# Patient Record
Sex: MALE | Race: BLACK OR AFRICAN AMERICAN | NOT HISPANIC OR LATINO | URBAN - METROPOLITAN AREA
[De-identification: names, ages, dates, MRNs, and addresses within clinical notes are randomized per-mention and may not be internally consistent; named-entity substitution may affect disease eponyms.]

---

## 2021-03-15 ENCOUNTER — VIRTUAL VISIT (OUTPATIENT)
Dept: UROLOGY | Facility: CLINIC | Age: 66
End: 2021-03-15
Payer: COMMERCIAL

## 2021-03-15 DIAGNOSIS — N52.9 ERECTILE DYSFUNCTION, UNSPECIFIED ERECTILE DYSFUNCTION TYPE: Primary | ICD-10-CM

## 2021-03-15 PROCEDURE — 99203 OFFICE O/P NEW LOW 30 MIN: CPT | Mod: GT | Performed by: UROLOGY

## 2021-03-15 RX ORDER — TADALAFIL 20 MG/1
TABLET ORAL
Qty: 30 TABLET | Refills: 3 | Status: SHIPPED | OUTPATIENT
Start: 2021-03-15

## 2021-03-15 NOTE — PROGRESS NOTES
Dominguez is a 65 year old who is being evaluated via a billable video visit.      How would you like to obtain your AVS? MyChart  If the video visit is dropped, the invitation should be resent by: Text to cell phone:    Will anyone else be joining your video visit? No      Video Start Time: 825    Assessment & Plan   Problem List Items Addressed This Visit     None      Visit Diagnoses     Erectile dysfunction, unspecified erectile dysfunction type    -  Primary           Review of external notes as documented elsewhere in note  25 minutes spent on the date of the encounter doing chart review, history and exam, documentation and further activities as noted above            No follow-ups on file.    Obi Anton MD  [facility]   Dominguez is a 65 year old who presents for the following health issues ED    HPI       Patient is a pleasant 65-year-old male who was seen today for a consultation with regard to patient's erectile dysfunction.  Patient has had problems with erectile dysfunction for a couple of years.  He has very soft erections not firm enough for penetration.  He has tried Viagra previously without any improvements.  His risk factor is high blood pressure.  He has no history of smoking.  He is a prediabetes.  His sex drive is good.    Review of Systems   Constitutional, HEENT, cardiovascular, pulmonary, gi and gu systems are negative, except as otherwise noted.      Objective           Vitals:  No vitals were obtained today due to virtual visit.    Physical Exam   GENERAL: Healthy, alert and no distress  EYES: Eyes grossly normal to inspection.  No discharge or erythema, or obvious scleral/conjunctival abnormalities.  RESP: No audible wheeze, cough, or visible cyanosis.  No visible retractions or increased work of breathing.    SKIN: Visible skin clear. No significant rash, abnormal pigmentation or lesions.  NEURO: Cranial nerves grossly intact.  Mentation and speech  appropriate for age.  PSYCH: Mentation appears normal, affect normal/bright, judgement and insight intact, normal speech and appearance well-groomed.    Pleasant 65-year-old male with erectile dysfunction due to long history of hypertension.  Treatment options discussed with patient at length today.  We discussed medical management, vacuum pump, penile injection, and lastly penile prosthesis.  Pros and cons discussed.  Patient elects to try another oral medication.  Cialis prescribed.  Follow-up with me if not better.            Video-Visit Details    Type of service:  Video Visit    Video End Time:845    Originating Location (pt. Location): Home    Distant Location (provider location):  [facility]     Platform used for Video Visit: [email or web address]

## 2021-11-09 ENCOUNTER — OFFICE VISIT (OUTPATIENT)
Dept: ENDOCRINOLOGY | Facility: CLINIC | Age: 66
End: 2021-11-09
Payer: COMMERCIAL

## 2021-11-09 ENCOUNTER — TELEPHONE (OUTPATIENT)
Dept: ENDOCRINOLOGY | Facility: CLINIC | Age: 66
End: 2021-11-09

## 2021-11-09 ENCOUNTER — TELEPHONE (OUTPATIENT)
Dept: NEPHROLOGY | Facility: CLINIC | Age: 66
End: 2021-11-09

## 2021-11-09 VITALS
HEART RATE: 76 BPM | SYSTOLIC BLOOD PRESSURE: 135 MMHG | OXYGEN SATURATION: 97 % | WEIGHT: 200 LBS | DIASTOLIC BLOOD PRESSURE: 76 MMHG | BODY MASS INDEX: 27.12 KG/M2

## 2021-11-09 DIAGNOSIS — I10 HYPERTENSION, UNSPECIFIED TYPE: ICD-10-CM

## 2021-11-09 DIAGNOSIS — Z09 HOSPITAL DISCHARGE FOLLOW-UP: ICD-10-CM

## 2021-11-09 DIAGNOSIS — Z51.81 ENCOUNTER FOR THERAPEUTIC DRUG MONITORING: ICD-10-CM

## 2021-11-09 DIAGNOSIS — E78.5 HYPERLIPIDEMIA LDL GOAL <70: ICD-10-CM

## 2021-11-09 DIAGNOSIS — N18.32 STAGE 3B CHRONIC KIDNEY DISEASE (H): ICD-10-CM

## 2021-11-09 DIAGNOSIS — E11.69 TYPE 2 DIABETES MELLITUS WITH OTHER SPECIFIED COMPLICATION, WITHOUT LONG-TERM CURRENT USE OF INSULIN (H): Primary | ICD-10-CM

## 2021-11-09 PROBLEM — E11.9 DIABETES MELLITUS, TYPE 2 (H): Status: ACTIVE | Noted: 2021-11-09

## 2021-11-09 LAB
ANION GAP SERPL CALCULATED.3IONS-SCNC: 3 MMOL/L (ref 3–14)
BUN SERPL-MCNC: 17 MG/DL (ref 7–30)
CALCIUM SERPL-MCNC: 9.6 MG/DL (ref 8.5–10.1)
CHLORIDE BLD-SCNC: 106 MMOL/L (ref 94–109)
CHOLEST SERPL-MCNC: 150 MG/DL
CO2 SERPL-SCNC: 30 MMOL/L (ref 20–32)
CREAT SERPL-MCNC: 1.29 MG/DL (ref 0.66–1.25)
FASTING STATUS PATIENT QL REPORTED: YES
GFR SERPL CREATININE-BSD FRML MDRD: 58 ML/MIN/1.73M2
GLUCOSE BLD-MCNC: 175 MG/DL (ref 70–99)
HBA1C MFR BLD: 6.6 % (ref 0–5.7)
HDLC SERPL-MCNC: 60 MG/DL
LDLC SERPL CALC-MCNC: 75 MG/DL
NONHDLC SERPL-MCNC: 90 MG/DL
POTASSIUM BLD-SCNC: 4 MMOL/L (ref 3.4–5.3)
SODIUM SERPL-SCNC: 139 MMOL/L (ref 133–144)
TRIGL SERPL-MCNC: 76 MG/DL
TSH SERPL DL<=0.005 MIU/L-ACNC: 0.44 MU/L (ref 0.4–4)

## 2021-11-09 PROCEDURE — 36415 COLL VENOUS BLD VENIPUNCTURE: CPT | Performed by: INTERNAL MEDICINE

## 2021-11-09 PROCEDURE — 99205 OFFICE O/P NEW HI 60 MIN: CPT | Performed by: INTERNAL MEDICINE

## 2021-11-09 PROCEDURE — 80061 LIPID PANEL: CPT | Performed by: INTERNAL MEDICINE

## 2021-11-09 PROCEDURE — 80048 BASIC METABOLIC PNL TOTAL CA: CPT | Performed by: INTERNAL MEDICINE

## 2021-11-09 PROCEDURE — 84443 ASSAY THYROID STIM HORMONE: CPT | Performed by: INTERNAL MEDICINE

## 2021-11-09 PROCEDURE — 83036 HEMOGLOBIN GLYCOSYLATED A1C: CPT | Performed by: INTERNAL MEDICINE

## 2021-11-09 RX ORDER — MULTIPLE VITAMINS W/ MINERALS TAB 9MG-400MCG
1 TAB ORAL DAILY
COMMUNITY

## 2021-11-09 RX ORDER — LISINOPRIL 10 MG/1
10 TABLET ORAL DAILY
Qty: 90 TABLET | Refills: 0 | Status: SHIPPED | OUTPATIENT
Start: 2021-11-09 | End: 2021-11-16

## 2021-11-09 RX ORDER — PREDNISONE 20 MG/1
20 TABLET ORAL PRN
COMMUNITY
Start: 2021-10-26

## 2021-11-09 RX ORDER — PREGABALIN 100 MG/1
100 CAPSULE ORAL
COMMUNITY
Start: 2021-04-09

## 2021-11-09 NOTE — LETTER
11/9/2021         RE: Dominguez Juares  [address]  [place] [address]        Dear Colleague,    Thank you for referring your patient, Dominguez Juares, to the [facility]. Please see a copy of my visit note below.    Endocrinology Clininc Visit    Chief Complaint: New Patient (Self referred, last seen with [facility]) and Diabetes (Diabetes type 2, discuss medication treatment-has problems with Metformin both extended and quick release in the past)     Information obtained from:Patient      Assessment/Treatment Plan:      Hospital discharge follow-up: Was admitted to [facility] for acute cholecystitis and at the time of his admission he has had two blood pressure medications lisinopril with hydrochlorothiazide combination was stopped. He was asked to follow-up as an outpatient for assessment of blood pressure, CKD and medication management of high blood pressure. From cholecystitis standpoint things are stable.    Hypertension: Longstanding hypertension and he was on minoxidil 10 mg twice daily, metoprolol 100 mg daily, lisinopril with hydrochlorothiazide 20-25 mg tablet once a day and amlodipine 10 mg daily at the time of his admission to the hospital. He was noted to have acute on chronic kidney disease therefore he was taken off of lisinopril and hydrochlorthiazide combination. His blood pressure today is within reasonable range. We had a long discussion regarding blood pressure management. Minoxidil is a medication which is usually added as a last resort and adding ACE inhibitor is desired  in this case given type 2 diabetes in the setting of chronic kidney disease stage IIIb. After discussing risk And benefit and the plan to check basic metabolic panel today started him on lisinopril 10 mg daily and we are slowly titrating down minoxidil to stop.  Plan-lisinopril 10 mg daily, amlodipine 10 mg daily and metoprolol 100 mg daily. Slowly titrate down minoxidil and  stop. Will check a blood pressure and visit metabolic panel again in 1 week.    Type 2 diabetes complicated by peripheral neuropathy and chronic kidney disease stage IIIb  He did not tolerate Metformin at home due to the GI side effects and is unwilling to continue with Metformin. He has been off of Metformin for 3 months however he is glycemic control is currently adequate with the hemoglobin A1c of 6.6 with the blood sugar readings at the time of his admission were within the target range. Multiple options of therapy discussed; adding SGLT2 elevators would be beneficial in this case due to his CKD stage IIIb. Jardiance 10 mg prescribed. Complication of this medications including genital yeast infection and UTI discussed.    Chronic kidney disease stage IIIb-check follow-up basic metabolic panel today. For long-term management of CKD stage III. Referral to nephrology are entered today.    Peripheral neuropathy-currently well controlled on pregabalin 100 mg three times per day. He follows with his primary care physician regarding this issue.    He and his wife are planning to relocate to [place] and is planning to leave by the end of this month therefore we are scheduling 1-2 weeks follow-up to follow-up on the changes we have made today.        Test and/or medications prescribed today:  Orders Placed This Encounter   Procedures     Hemoglobin A1c POCT         Nieves Young MD  Staff Endocrinologist    Division of Endocrinology and Diabetes      Subjective:         HPI: Dominguez Juares is a 66 year old male with history of multiple medical issues listed below who is here for a follow-up of the same.     DM diagnosed 15 years ago.   Couldn't tolerate metformin due to GI side effects. Took it 5-6 years. Then was taken off metformin.   About a year ago restarted but didn't tolerte both short-acting and extended release Metformin. He stated that he would never take this medication again. He is here to discuss  other options of management.     Was admitted to [facility] for acute cholecystitis and at the time of his admission he has had two blood pressure medications lisinopril with hydrochlorothiazide combination was stopped. He was asked to follow-up as an outpatient for assessment of blood pressure, CKD and medication management of high blood pressure. From cholecystitis standpoint things are stable.    Longstanding hypertension and he was on minoxidil 10 mg twice daily, metoprolol 100 mg daily, lisinopril with hydrochlorothiazide 20-25 mg tablet once a day and amlodipine 10 mg daily at the time of his admission to the hospital. He was noted to have acute on chronic kidney disease therefore he was taken off of lisinopril and hydrochlorthiazide combination.    Type 2 diabetes complicated by peripheral neuropathy and chronic kidney disease stage IIIb. He is currently taking statin.  He did not tolerate Metformin due to the GI side effects and is unwilling to continue with Metformin. He has been off of Metformin for 3 months however he is glycemic control is currently adequate with the hemoglobin A1c of 6.6 with the blood sugar readings at the time of his admission were within the target range.     Peripheral neuropathy-currently well controlled on pregabalin 100 mg three times per day. He follows with his primary care physician regarding this issue.    He is asking if he needs to take omeprazole. Asked to follow-up with his primary care physician regarding this issue.    Allergies   Allergen Reactions     Metformin      Diarrhea, stomach ache     Testosterone      Injections caused weight gain and patient self stopped       Current Outpatient Medications   Medication Sig Dispense Refill     amLODIPine (NORVASC) 10 MG tablet Take 1 tablet by mouth daily       aspirin 81 MG tablet Take 81 mg by mouth daily       cholecalciferol 25 MCG (1000 UT) TABS Take 1 tablet by mouth       fluticasone (FLONASE) 50 MCG/ACT  nasal spray Spray 2 sprays in nostril daily       KLOR-CON 20 MEQ packet Take 1 tablet by mouth daily       metoprolol (LOPRESSOR) 100 MG tablet Take 1 tablet by mouth daily       minoxidil (LONITEN) 10 MG tablet Take 1 tablet by mouth daily       omeprazole (PRILOSEC) 20 MG capsule Take 1 capsule by mouth daily       pregabalin (LYRICA) 100 MG capsule Take 100 mg by mouth 3 times daily       simvastatin (ZOCOR) 20 MG tablet Take 1 tablet by mouth daily       tadalafil (CIALIS) 20 MG tablet Take one tablet every other day with sex 30 tablet 3     gabapentin (NEURONTIN) 100 MG capsule Take 1 capsule by mouth daily (Patient not taking: Reported on 11/9/2021)       HYDROcodone-acetaminophen (NORCO) 5-325 MG per tablet Take 1 tablet by mouth every 6 hours as needed for pain Maximum of 4000 mg of acetaminophen in 24 hours. (Patient not taking: Reported on 11/9/2021) 30 tablet 0     lisinopril-hydrochlorothiazide (PRINZIDE,ZESTORETIC) 20-25 MG per tablet Take 1 tablet by mouth daily (Patient not taking: Reported on 11/9/2021)       multivitamin w/minerals (MULTIVITAMIN, THERAPEUTIC WITH MINERALS) tablet Take 1 tablet by mouth daily       predniSONE (DELTASONE) 20 MG tablet Take 20 mg by mouth as needed For gout flareup (Patient not taking: Reported on 11/9/2021)         Review of Systems     as per HPI above      Objective:   /76 (BP Location: Left arm, Patient Position: Sitting, Cuff Size: Adult Large)   Pulse 76   Wt 90.7 kg (200 lb)   SpO2 97%   BMI 27.12 kg/m       Constitutional: Pleasant no acute cardiopulmonary distress.   EYES: anicteric, normal extra-ocular movements, no lid lag or retraction, is equal and reactive to light bilaterally.  Cardiovascular: RRR, S1, S2 normal.   Pulmonary/Chest: CTAB. No wheezing or rales.   Abdominal: +BS. Non tender to palpation.    Neurological: Alert and oriented.  No tremor and reflexes are symmetrical bilaterally and within the normal limits. Muscle strength 5/5.    Extremities: No edema.  Psychological: appropriate mood and affect.    In House Labs:         Hemoglobin A1C   Date Value Ref Range Status   11/09/2021 6.6 (A) 0.0 - 5.7 % Final       This note has been dictated using voice recognition software.  As a result, there may be errors in the documentation that have gone undetected.  Please consider this when interpreting information in this documentation.      62 minutes spent (more than 45 minutes face to face with the patient) on the date of the encounter doing chart review, history and exam, documentation and further activities per the note.       Again, thank you for allowing me to participate in the care of your patient.        Sincerely,        Nieves Young MD

## 2021-11-09 NOTE — TELEPHONE ENCOUNTER
Patient called earlier. Records from [facility] in Care Everywhere updated, Last clinic appointment, glucose and A1C with [facility]. He has been having medication side effect problems with Metformin both extended and quick release. Medication Metformin started about 6/2021 then stopped about 3 months ago, history gall bladder surgery 9/2021. He is currently working with  from [facility] and was recommended to see a internal medicine provider or endocrinologist provider.     Kieran Díaz [facility]  Adult Endocrinology  [facility]

## 2021-11-09 NOTE — NURSING NOTE
Dominguez Juares's goals for this visit include:   Chief Complaint   Patient presents with     New Patient     Self referred, last seen with [facility]     Diabetes     Diabetes type 2, discuss medication treatment-has problems with Metformin both extended and quick release in the past     He requests these members of his care team be copied on today's visit information: Yes    PCP: Ge Mccann    Referring Provider:  Referred Self, MD  No address on file    /76 (BP Location: Left arm, Patient Position: Sitting, Cuff Size: Adult Large)   Pulse 76   Wt 90.7 kg (200 lb)   SpO2 97%   BMI 27.12 kg/m      Do you need any medication refills at today's visit? Further discuss medication    Kieran Díaz [facility]  Adult Endocrinology  [facility]

## 2021-11-09 NOTE — TELEPHONE ENCOUNTER
[facility] Call Center    Phone Message    May a detailed message be left on voicemail: yes     Reason for Call: Appointment Intake    Referring Provider Name: Nieves Young  Diagnosis and/or Symptoms: Stage 3b Chronic Kidney Disease    Patient is being referred for Stage 3 CKD and needs an appointment before he leaves the state on Monday, 11/29. Writer unable to find anything prior to that date for any location. Patient is open to doing either virtual, or in person. Sending encounter message for clinic review and follow-up with patient.     Action Taken: Message routed to:  [facility]  Nephrology    Travel Screening: Not Applicable

## 2021-11-09 NOTE — PATIENT INSTRUCTIONS
Dominguez,    Reduce minoxidile to one tablet a day for 3-4 days and then stop.   Start Lisinopril 10 mg daily.   Start Jardiance 10 mg daily.   Continue with all other medications as you are currently doing.         [facility]-Department of Endocrinology  Marina Franks RN, Diabetes Educator: [phone]  Clinic Nurses MAGNOLIA Sneed; CMA's: Wellington Mo Yang Mee   Clinic Fax: [phone]  On-Call Endocrine at [facility] (after hours/weekends): [phone] option 4  Scheduling Line: [phone]     Appointment Reminders:  * Please bring meter with for staff to download  * If you are due ONLY for an A1C, it is scheduled with the nurse and will be done in clinic. You do not need to schedule a lab appointment. Fasting is not required for an A1C.  * Refill request should be submitted to your pharmacy. They will contact clinic for approval.

## 2021-11-09 NOTE — PROGRESS NOTES
Endocrinology Clininc Visit    Chief Complaint: New Patient (Self referred, last seen with [facility]) and Diabetes (Diabetes type 2, discuss medication treatment-has problems with Metformin both extended and quick release in the past)     Information obtained from:Patient      Assessment/Treatment Plan:      Hospital discharge follow-up: Was admitted to [facility] for acute cholecystitis and at the time of his admission he has had two blood pressure medications lisinopril with hydrochlorothiazide combination was stopped. He was asked to follow-up as an outpatient for assessment of blood pressure, CKD and medication management of high blood pressure. From cholecystitis standpoint things are stable.    Hypertension: Longstanding hypertension and he was on minoxidil 10 mg twice daily, metoprolol 100 mg daily, lisinopril with hydrochlorothiazide 20-25 mg tablet once a day and amlodipine 10 mg daily at the time of his admission to the hospital. He was noted to have acute on chronic kidney disease therefore he was taken off of lisinopril and hydrochlorthiazide combination. His blood pressure today is within reasonable range. We had a long discussion regarding blood pressure management. Minoxidil is a medication which is usually added as a last resort and adding ACE inhibitor is desired  in this case given type 2 diabetes in the setting of chronic kidney disease stage IIIb. After discussing risk And benefit and the plan to check basic metabolic panel today started him on lisinopril 10 mg daily and we are slowly titrating down minoxidil to stop.  Plan-lisinopril 10 mg daily, amlodipine 10 mg daily and metoprolol 100 mg daily. Slowly titrate down minoxidil and stop. Will check a blood pressure and visit metabolic panel again in 1 week.    Type 2 diabetes complicated by peripheral neuropathy and chronic kidney disease stage IIIb  He did not tolerate Metformin at home due to the GI side effects and is  unwilling to continue with Metformin. He has been off of Metformin for 3 months however he is glycemic control is currently adequate with the hemoglobin A1c of 6.6 with the blood sugar readings at the time of his admission were within the target range. Multiple options of therapy discussed; adding SGLT2 elevators would be beneficial in this case due to his CKD stage IIIb. Jardiance 10 mg prescribed. Complication of this medications including genital yeast infection and UTI discussed.    Chronic kidney disease stage IIIb-check follow-up basic metabolic panel today. For long-term management of CKD stage III. Referral to nephrology are entered today.    Peripheral neuropathy-currently well controlled on pregabalin 100 mg three times per day. He follows with his primary care physician regarding this issue.    He and his wife are planning to relocate to [place] and is planning to leave by the end of this month therefore we are scheduling 1-2 weeks follow-up to follow-up on the changes we have made today.        Test and/or medications prescribed today:  Orders Placed This Encounter   Procedures     Hemoglobin A1c POCT         Nieves Young MD  Staff Endocrinologist    Division of Endocrinology and Diabetes      Subjective:         HPI: Dominguez Juares is a 66 year old male with history of multiple medical issues listed below who is here for a follow-up of the same.     DM diagnosed 15 years ago.   Couldn't tolerate metformin due to GI side effects. Took it 5-6 years. Then was taken off metformin.   About a year ago restarted but didn't tolerte both short-acting and extended release Metformin. He stated that he would never take this medication again. He is here to discuss other options of management.     Was admitted to [facility] for acute cholecystitis and at the time of his admission he has had two blood pressure medications lisinopril with hydrochlorothiazide combination was stopped. He was asked  to follow-up as an outpatient for assessment of blood pressure, CKD and medication management of high blood pressure. From cholecystitis standpoint things are stable.    Longstanding hypertension and he was on minoxidil 10 mg twice daily, metoprolol 100 mg daily, lisinopril with hydrochlorothiazide 20-25 mg tablet once a day and amlodipine 10 mg daily at the time of his admission to the hospital. He was noted to have acute on chronic kidney disease therefore he was taken off of lisinopril and hydrochlorthiazide combination.    Type 2 diabetes complicated by peripheral neuropathy and chronic kidney disease stage IIIb. He is currently taking statin.  He did not tolerate Metformin due to the GI side effects and is unwilling to continue with Metformin. He has been off of Metformin for 3 months however he is glycemic control is currently adequate with the hemoglobin A1c of 6.6 with the blood sugar readings at the time of his admission were within the target range.     Peripheral neuropathy-currently well controlled on pregabalin 100 mg three times per day. He follows with his primary care physician regarding this issue.    He is asking if he needs to take omeprazole. Asked to follow-up with his primary care physician regarding this issue.    Allergies   Allergen Reactions     Metformin      Diarrhea, stomach ache     Testosterone      Injections caused weight gain and patient self stopped       Current Outpatient Medications   Medication Sig Dispense Refill     amLODIPine (NORVASC) 10 MG tablet Take 1 tablet by mouth daily       aspirin 81 MG tablet Take 81 mg by mouth daily       cholecalciferol 25 MCG (1000 UT) TABS Take 1 tablet by mouth       fluticasone (FLONASE) 50 MCG/ACT nasal spray Spray 2 sprays in nostril daily       KLOR-CON 20 MEQ packet Take 1 tablet by mouth daily       metoprolol (LOPRESSOR) 100 MG tablet Take 1 tablet by mouth daily       minoxidil (LONITEN) 10 MG tablet Take 1 tablet by mouth daily        omeprazole (PRILOSEC) 20 MG capsule Take 1 capsule by mouth daily       pregabalin (LYRICA) 100 MG capsule Take 100 mg by mouth 3 times daily       simvastatin (ZOCOR) 20 MG tablet Take 1 tablet by mouth daily       tadalafil (CIALIS) 20 MG tablet Take one tablet every other day with sex 30 tablet 3     gabapentin (NEURONTIN) 100 MG capsule Take 1 capsule by mouth daily (Patient not taking: Reported on 11/9/2021)       HYDROcodone-acetaminophen (NORCO) 5-325 MG per tablet Take 1 tablet by mouth every 6 hours as needed for pain Maximum of 4000 mg of acetaminophen in 24 hours. (Patient not taking: Reported on 11/9/2021) 30 tablet 0     lisinopril-hydrochlorothiazide (PRINZIDE,ZESTORETIC) 20-25 MG per tablet Take 1 tablet by mouth daily (Patient not taking: Reported on 11/9/2021)       multivitamin w/minerals (MULTIVITAMIN, THERAPEUTIC WITH MINERALS) tablet Take 1 tablet by mouth daily       predniSONE (DELTASONE) 20 MG tablet Take 20 mg by mouth as needed For gout flareup (Patient not taking: Reported on 11/9/2021)         Review of Systems     as per HPI above      Objective:   /76 (BP Location: Left arm, Patient Position: Sitting, Cuff Size: Adult Large)   Pulse 76   Wt 90.7 kg (200 lb)   SpO2 97%   BMI 27.12 kg/m       Constitutional: Pleasant no acute cardiopulmonary distress.   EYES: anicteric, normal extra-ocular movements, no lid lag or retraction, is equal and reactive to light bilaterally.  Cardiovascular: RRR, S1, S2 normal.   Pulmonary/Chest: CTAB. No wheezing or rales.   Abdominal: +BS. Non tender to palpation.    Neurological: Alert and oriented.  No tremor and reflexes are symmetrical bilaterally and within the normal limits. Muscle strength 5/5.   Extremities: No edema.  Psychological: appropriate mood and affect.    In House Labs:         Hemoglobin A1C   Date Value Ref Range Status   11/09/2021 6.6 (A) 0.0 - 5.7 % Final       This note has been dictated using voice recognition software.   As a result, there may be errors in the documentation that have gone undetected.  Please consider this when interpreting information in this documentation.      62 minutes spent (more than 45 minutes face to face with the patient) on the date of the encounter doing chart review, history and exam, documentation and further activities per the note.

## 2021-11-09 NOTE — TELEPHONE ENCOUNTER
[facility] Call Center    Phone Message    May a detailed message be left on voicemail: yes     Reason for Call: Other: Pt says he received a call from clinic about his appointment to call in. I do not see any notes why pt would have received call. pt is requesting call back at #[phone] and it is ok to leave detailed message.      Action Taken: Other: Endo    Travel Screening: Not Applicable

## 2021-11-11 ENCOUNTER — TELEPHONE (OUTPATIENT)
Dept: ENDOCRINOLOGY | Facility: CLINIC | Age: 66
End: 2021-11-11
Payer: COMMERCIAL

## 2021-11-11 NOTE — TELEPHONE ENCOUNTER
Patient advised of results and recommendations. Patient verbalizes understanding and agrees to plan.       Nedra Jackson RN  Endocrine Care Coordinator  [facility]

## 2021-11-11 NOTE — TELEPHONE ENCOUNTER
----- Message from Nieves Young MD sent at 11/11/2021  8:06 AM CST -----  Please call patient and inform:  #1 thyroid blood work results are within the normal limits.2.  Cholesterol blood work results are within the desired range.3.  Your kidney function test results indicates stage III chronic kidney disease and compared to previous studies results are stable.  Please let us know if any questions.  Nieves Young MD

## 2021-11-11 NOTE — RESULT ENCOUNTER NOTE
Please call patient and inform:  #1 thyroid blood work results are within the normal limits.2.  Cholesterol blood work results are within the desired range.3.  Your kidney function test results indicates stage III chronic kidney disease and compared to previous studies results are stable.  Please let us know if any questions.  Nieves Young MD

## 2021-11-11 NOTE — CONFIDENTIAL NOTE
[facility] and [facility] do not have sooner availability for an appointment for new CKD patient.

## 2021-11-15 NOTE — PROGRESS NOTES
Outcome for 11/15/21 10:19 AM : left message for the patient to call back with glucose readings or to bring meter to appointment.    Chely Villarreal [facility]  Adult Endocrinology  [facility]

## 2021-11-15 NOTE — TELEPHONE ENCOUNTER
Spoke to patient. Offered 11/16 video visit with Dr. Shanks. He is unable to do this due to the closing of his home. He is moving out of state to [place]. Encouraged establishing with nephrology in [place] when able. Patient verbalized understanding.    Xiomy Medellin, RN, BSN  Nephrology Care Coordinator  [facility]

## 2021-11-16 ENCOUNTER — TELEPHONE (OUTPATIENT)
Dept: ENDOCRINOLOGY | Facility: CLINIC | Age: 66
End: 2021-11-16
Payer: COMMERCIAL

## 2021-11-16 ENCOUNTER — OFFICE VISIT (OUTPATIENT)
Dept: ENDOCRINOLOGY | Facility: CLINIC | Age: 66
End: 2021-11-16
Payer: COMMERCIAL

## 2021-11-16 VITALS
SYSTOLIC BLOOD PRESSURE: 146 MMHG | HEART RATE: 71 BPM | WEIGHT: 192.5 LBS | BODY MASS INDEX: 26.11 KG/M2 | OXYGEN SATURATION: 98 % | DIASTOLIC BLOOD PRESSURE: 90 MMHG

## 2021-11-16 DIAGNOSIS — I10 HYPERTENSION, UNSPECIFIED TYPE: ICD-10-CM

## 2021-11-16 DIAGNOSIS — N18.32 STAGE 3B CHRONIC KIDNEY DISEASE (H): ICD-10-CM

## 2021-11-16 DIAGNOSIS — Z51.81 ENCOUNTER FOR THERAPEUTIC DRUG MONITORING: ICD-10-CM

## 2021-11-16 DIAGNOSIS — E11.69 TYPE 2 DIABETES MELLITUS WITH OTHER SPECIFIED COMPLICATION, WITHOUT LONG-TERM CURRENT USE OF INSULIN (H): ICD-10-CM

## 2021-11-16 LAB
ANION GAP SERPL CALCULATED.3IONS-SCNC: 4 MMOL/L (ref 3–14)
BUN SERPL-MCNC: 19 MG/DL (ref 7–30)
CALCIUM SERPL-MCNC: 9.5 MG/DL (ref 8.5–10.1)
CHLORIDE BLD-SCNC: 106 MMOL/L (ref 94–109)
CO2 SERPL-SCNC: 29 MMOL/L (ref 20–32)
CREAT SERPL-MCNC: 1.27 MG/DL (ref 0.66–1.25)
GFR SERPL CREATININE-BSD FRML MDRD: 59 ML/MIN/1.73M2
GLUCOSE BLD-MCNC: 134 MG/DL (ref 70–99)
POTASSIUM BLD-SCNC: 3.5 MMOL/L (ref 3.4–5.3)
SODIUM SERPL-SCNC: 139 MMOL/L (ref 133–144)

## 2021-11-16 PROCEDURE — 36415 COLL VENOUS BLD VENIPUNCTURE: CPT | Performed by: INTERNAL MEDICINE

## 2021-11-16 PROCEDURE — 80048 BASIC METABOLIC PNL TOTAL CA: CPT | Performed by: INTERNAL MEDICINE

## 2021-11-16 PROCEDURE — 99214 OFFICE O/P EST MOD 30 MIN: CPT | Performed by: INTERNAL MEDICINE

## 2021-11-16 RX ORDER — FLASH GLUCOSE SCANNING READER
EACH MISCELLANEOUS
Qty: 1 EACH | Refills: 0 | Status: SHIPPED | OUTPATIENT
Start: 2021-11-16

## 2021-11-16 RX ORDER — LISINOPRIL 20 MG/1
20 TABLET ORAL DAILY
Qty: 90 TABLET | Refills: 0 | Status: SHIPPED | OUTPATIENT
Start: 2021-11-16

## 2021-11-16 RX ORDER — FLASH GLUCOSE SENSOR
KIT MISCELLANEOUS
Qty: 2 EACH | Refills: 11 | Status: SHIPPED | OUTPATIENT
Start: 2021-11-16

## 2021-11-16 NOTE — NURSING NOTE
Dominguez Juares's goals for this visit include:   Chief Complaint   Patient presents with     Diabetes     He requests these members of his care team be copied on today's visit information: Yes    PCP: Ge Mccann    Referring Provider:  Ge Mccann PA-C  [facility]  [address] [place] [address]    BP (!) 166/92 (BP Location: Left arm, Patient Position: Sitting, Cuff Size: Adult Large)   Pulse 71   Wt 87.3 kg (192 lb 8 oz)   SpO2 98%   BMI 26.11 kg/m      Do you need any medication refills at today's visit? No

## 2021-11-16 NOTE — LETTER
11/16/2021         RE: Dominguez Juares  [address]  [place] [address]        Dear Colleague,    Thank you for referring your patient, Dominguez Juares, to the [facility]. Please see a copy of my visit note below.    Outcome for 11/15/21 10:19 AM : left message for the patient to call back with glucose readings or to bring meter to appointment.    Chely Villarreal [facility]  Adult Endocrinology  [facility]        Endocrinology Clininc Visit    Chief Complaint: Diabetes     Information obtained from:Patient      Assessment/Treatment Plan:      Hospital discharge follow-up: Was admitted to [facility] for acute cholecystitis and at the time of his admission he has had two blood pressure medications lisinopril with hydrochlorothiazide combination which was stopped. He was asked to follow-up as an outpatient for assessment of blood pressure, CKD and medication management of high blood pressure.  We stopped minoxidil at his last visit and restarted lisinopril at 10 mg daily.  We will check a follow-up BMP today.  His blood pressure was slightly elevated at 146/90 therefore if BMP is stable we will go up on the lisinopril to 20 mg daily.    Hypertension: Longstanding hypertension and he was on minoxidil 10 mg twice daily, metoprolol 100 mg daily, lisinopril with hydrochlorothiazide 20-25 mg tablet once a day and amlodipine 10 mg daily at the time of his admission to the hospital. He was noted to have acute on chronic kidney disease therefore he was taken off of lisinopril and hydrochlorthiazide combination.     At the time of his follow-up a week ago -after discussing risk And benefit - We stopped minoxidil and restarted lisinopril at 10 mg daily.  We checked basic metabolic panel today which demonstrated stable kidney function and electrolytes with creatinine improving to 1.27 from previously 1.6.  His blood pressure was slightly elevated at 146/90  therefore increase lisinopril to 20 mg daily. Continue amlodipine 10 mg daily and metoprolol 100 mg daily.  Screen for primary aldosteronism with aldosterone and renin activity.     Type 2 diabetes complicated by peripheral neuropathy and chronic kidney disease stage IIIb  He did not tolerate Metformin at home due to the GI side effects and is unwilling to continue with Metformin. He has been off of Metformin for 3 months however he is glycemic control is currently adequate with the hemoglobin A1c of 6.6 with the blood sugar readings at the time of his admission were within the target range. Multiple options of therapy discussed; added SGLT2 inhibitor-Jardiance 10 mg daily at his last visit.  He is telling me that he has had multiple trips to the bathroom since he started Jardiance.  He is not interested in adding GLP-1 agonist or switching Jardiance with GLP-1 agonist.      Chronic kidney disease stage IIIb-basic metabolic panel today showed improvement in his GFR therefore we are increasing the lisinopril dose to 20 mg daily.   Peripheral neuropathy-currently well controlled on pregabalin 100 mg three times per day. Monofilament examination completed today.  It was abnormal particularly in the heel area.  No ulcer or open lesion.     He and his wife are planning to relocate to [place] and is planning to leave by the end of this month; I have advised to schedule an appointment with a provider as soon as he moves there.      Test and/or medications prescribed today:  Orders Placed This Encounter   Procedures     Basic metabolic panel         Nieves Young MD  Staff Endocrinologist    Division of Endocrinology and Diabetes      Subjective:         HPI: Dominguez Juares is a 66 year old male with history of multiple medical issues listed below who is here for a follow-up of the same.     DM diagnosed 15 years ago.   Couldn't tolerate metformin due to GI side effects. Took it 5-6 years. Then was taken off  metformin.   About a year ago restarted but didn't tolerte both short-acting and extended release Metformin. He stated that he would never take this medication again. He is here to discuss other options of management.     Was admitted to [facility] for acute cholecystitis and at the time of his admission he has had two blood pressure medications lisinopril with hydrochlorothiazide combination was stopped. He was asked to follow-up as an outpatient for assessment of blood pressure, CKD and medication management of high blood pressure. From cholecystitis standpoint things are stable.    Longstanding hypertension and he was on minoxidil 10 mg twice daily, metoprolol 100 mg daily, lisinopril with hydrochlorothiazide 20-25 mg tablet once a day and amlodipine 10 mg daily at the time of his admission to the hospital. He was noted to have acute on chronic kidney disease therefore he was taken off of lisinopril and hydrochlorthiazide combination.  At his last visit after looking at CKD has improved we restarted lisinopril 10 mg daily and start minoxidil.  His blood pressure today was 146/90.  Type 2 diabetes complicated by peripheral neuropathy and chronic kidney disease stage IIIb. He is currently taking statin.  He did not tolerate Metformin due to the GI side effects and is unwilling to continue with Metformin. He has been off of Metformin for 3 months however he is glycemic control is currently adequate with the hemoglobin A1c of 6.6 with the blood sugar readings at the time of his admission were within the target range.   Started Jardiance 10 mg daily at his last visit however he has had multiple bathroom trips since he has started Jardiance and is not sure if you would like to continue with this medication.  Peripheral neuropathy-currently well controlled on pregabalin 100 mg three times per day.     No new issues today.    Allergies   Allergen Reactions     Metformin      Diarrhea, stomach ache      Testosterone      Injections caused weight gain and patient self stopped       Current Outpatient Medications   Medication Sig Dispense Refill     amLODIPine (NORVASC) 10 MG tablet Take 1 tablet by mouth daily       aspirin 81 MG tablet Take 81 mg by mouth daily       cholecalciferol 25 MCG (1000 UT) TABS Take 1 tablet by mouth       Continuous Blood Gluc  (FREESTYLE WILILE 14 DAY READER) HIREN Use to read blood sugars as per 's instructions. 1 each 0     Continuous Blood Gluc Sensor (FREESTYLE WILLIE 14 DAY SENSOR) MISC Change every 14 days. 2 each 11     fluticasone (FLONASE) 50 MCG/ACT nasal spray Spray 2 sprays in nostril daily       KLOR-CON 20 MEQ packet Take 1 tablet by mouth daily       lisinopril (ZESTRIL) 10 MG tablet Take 1 tablet (10 mg) by mouth daily 90 tablet 0     metoprolol (LOPRESSOR) 100 MG tablet Take 1 tablet by mouth daily       multivitamin w/minerals (MULTIVITAMIN, THERAPEUTIC WITH MINERALS) tablet Take 1 tablet by mouth daily       omeprazole (PRILOSEC) 20 MG capsule Take 1 capsule by mouth daily       pregabalin (LYRICA) 100 MG capsule Take 100 mg by mouth 3 times daily       simvastatin (ZOCOR) 20 MG tablet Take 1 tablet by mouth daily       tadalafil (CIALIS) 20 MG tablet Take one tablet every other day with sex 30 tablet 3     predniSONE (DELTASONE) 20 MG tablet Take 20 mg by mouth as needed For gout flareup (Patient not taking: Reported on 11/9/2021)         Review of Systems     as per HPI above      Objective:   BP (!) 146/90 (BP Location: Left arm, Patient Position: Chair, Cuff Size: Adult Large)   Pulse 71   Wt 87.3 kg (192 lb 8 oz)   SpO2 98%   BMI 26.11 kg/m       Constitutional: Pleasant no acute cardiopulmonary distress.   EYES: anicteric, normal extra-ocular movements, no lid lag or retraction, is equal and reactive to light bilaterally.  Cardiovascular: RRR, S1, S2 normal.   Pulmonary/Chest: CTAB. No wheezing or rales.   Abdominal: +BS. Non tender to  palpation.    Neurological: Alert and oriented.  No tremor and reflexes are symmetrical bilaterally and within the normal limits. Muscle strength 5/5.   Extremities: No edema.  Psychological: appropriate mood and affect.  Sensory exam of the foot is abnormal, tested with the monofilament. no lesions or ulcers. calluses involving heels.     In House Labs:         Hemoglobin A1C   Date Value Ref Range Status   11/09/2021 6.6 (A) 0.0 - 5.7 % Final       This note has been dictated using voice recognition software.  As a result, there may be errors in the documentation that have gone undetected.  Please consider this when interpreting information in this documentation.            Again, thank you for allowing me to participate in the care of your patient.        Sincerely,        Nieves Young MD

## 2021-11-16 NOTE — RESULT ENCOUNTER NOTE
Please call and inform Mr. Juares that his kidney function test results and electrolytes are stable compared to a week ago. Please increase the dose of Lisinopril to 20 mg daily from the current 10 mg daily.  He has the option of taking 2 tablets of the 10 mg he has on hand.  Once is done he can  the new prescription which is a 20 mg tablet of lisinopril.

## 2021-11-16 NOTE — TELEPHONE ENCOUNTER
PA Initiation    Medication: Freestyle PA pending  Insurance Company: [employer] ([facility]) - Phone [phone] Fax [phone]  Pharmacy Filling the Rx:    Filling Pharmacy Phone:    Filling Pharmacy Fax:    Start Date: 11/16/2021

## 2021-11-16 NOTE — TELEPHONE ENCOUNTER
----- Message from Nieves Young MD sent at 11/16/2021 11:12 AM CST -----  Please call and inform Mr. Juares that his kidney function test results and electrolytes are stable compared to a week ago. Please increase the dose of Lisinopril to 20 mg daily from the current 10 mg daily.  He has the option of taking 2 tablets of the 10 mg he has on hand.  Once is done he can  the new prescription which is a 20 mg tablet of lisinopril.

## 2021-11-16 NOTE — PATIENT INSTRUCTIONS
Dominguez,    Lisinopril 10 mg daily.   Jardiance 10 mg daily.   Continue with all other medications as you are currently doing.         [facility]-Department of Endocrinology  Marina Franks RN, Diabetes Educator: [phone]  [facility] Nurses MAGNOLIA Sneed; CMA's: Wellington Mo Yang Yenifer   Clinic Fax: [phone]  On-Call Endocrine at [facility] (after hours/weekends): [phone] option 4  Scheduling Line: [phone]     Appointment Reminders:  * Please bring meter with for staff to download  * If you are due ONLY for an A1C, it is scheduled with the nurse and will be done in clinic. You do not need to schedule a lab appointment. Fasting is not required for an A1C.  * Refill request should be submitted to your pharmacy. They will contact clinic for approval.          [facility]  Marina Franks RN, Diabetes Educator: [phone]  [facility] Nurses MAGNOLIA Sneed; CMA's: Brent Mo [phone]  [facility] Fax: [phone]  On-Call Endocrine at [facility] (after hours/weekends): [phone] option 4  Scheduling Line: [phone]     Appointment Reminders:  * Please bring meter with for staff to download  * If you are due ONLY for an A1C, it is scheduled with the nurse and will be done in clinic. You do not need to schedule a lab appointment. Fasting is not required for an A1C.  * Refill request should be submitted to your pharmacy. They will contact clinic for approval.

## 2021-11-16 NOTE — CONFIDENTIAL NOTE
Patient advised of recommendations from Dr. Young. Patient verbalizes understanding and agrees to plan.      Nedra Jackson RN  Endocrine Care Coordinator  [facility]

## 2021-11-16 NOTE — PROGRESS NOTES
Endocrinology Clininc Visit    Chief Complaint: Diabetes     Information obtained from:Patient      Assessment/Treatment Plan:      Hospital discharge follow-up: Was admitted to [facility] for acute cholecystitis and at the time of his admission he has had two blood pressure medications lisinopril with hydrochlorothiazide combination which was stopped. He was asked to follow-up as an outpatient for assessment of blood pressure, CKD and medication management of high blood pressure.  We stopped minoxidil at his last visit and restarted lisinopril at 10 mg daily.  We will check a follow-up BMP today.  His blood pressure was slightly elevated at 146/90 therefore if BMP is stable we will go up on the lisinopril to 20 mg daily.    Hypertension: Longstanding hypertension and he was on minoxidil 10 mg twice daily, metoprolol 100 mg daily, lisinopril with hydrochlorothiazide 20-25 mg tablet once a day and amlodipine 10 mg daily at the time of his admission to the hospital. He was noted to have acute on chronic kidney disease therefore he was taken off of lisinopril and hydrochlorthiazide combination.     At the time of his follow-up a week ago -after discussing risk And benefit - We stopped minoxidil and restarted lisinopril at 10 mg daily.  We checked basic metabolic panel today which demonstrated stable kidney function and electrolytes with creatinine improving to 1.27 from previously 1.6.  His blood pressure was slightly elevated at 146/90 therefore increase lisinopril to 20 mg daily. Continue amlodipine 10 mg daily and metoprolol 100 mg daily.  Screen for primary aldosteronism with aldosterone and renin activity.     Type 2 diabetes complicated by peripheral neuropathy and chronic kidney disease stage IIIb  He did not tolerate Metformin at home due to the GI side effects and is unwilling to continue with Metformin. He has been off of Metformin for 3 months however he is glycemic control is currently adequate  with the hemoglobin A1c of 6.6 with the blood sugar readings at the time of his admission were within the target range. Multiple options of therapy discussed; added SGLT2 inhibitor-Jardiance 10 mg daily at his last visit.  He is telling me that he has had multiple trips to the bathroom since he started Jardiance.  He is not interested in adding GLP-1 agonist or switching Jardiance with GLP-1 agonist.      Chronic kidney disease stage IIIb-basic metabolic panel today showed improvement in his GFR therefore we are increasing the lisinopril dose to 20 mg daily.   Peripheral neuropathy-currently well controlled on pregabalin 100 mg three times per day. Monofilament examination completed today.  It was abnormal particularly in the heel area.  No ulcer or open lesion.     He and his wife are planning to relocate to [place] and is planning to leave by the end of this month; I have advised to schedule an appointment with a provider as soon as he moves there.      Test and/or medications prescribed today:  Orders Placed This Encounter   Procedures     Basic metabolic panel         Nieves Young MD  Staff Endocrinologist    Division of Endocrinology and Diabetes      Subjective:         HPI: Dominguez Juares is a 66 year old male with history of multiple medical issues listed below who is here for a follow-up of the same.     DM diagnosed 15 years ago.   Couldn't tolerate metformin due to GI side effects. Took it 5-6 years. Then was taken off metformin.   About a year ago restarted but didn't tolerte both short-acting and extended release Metformin. He stated that he would never take this medication again. He is here to discuss other options of management.     Was admitted to [facility] for acute cholecystitis and at the time of his admission he has had two blood pressure medications lisinopril with hydrochlorothiazide combination was stopped. He was asked to follow-up as an outpatient for assessment of  blood pressure, CKD and medication management of high blood pressure. From cholecystitis standpoint things are stable.    Longstanding hypertension and he was on minoxidil 10 mg twice daily, metoprolol 100 mg daily, lisinopril with hydrochlorothiazide 20-25 mg tablet once a day and amlodipine 10 mg daily at the time of his admission to the hospital. He was noted to have acute on chronic kidney disease therefore he was taken off of lisinopril and hydrochlorthiazide combination.  At his last visit after looking at CKD has improved we restarted lisinopril 10 mg daily and start minoxidil.  His blood pressure today was 146/90.  Type 2 diabetes complicated by peripheral neuropathy and chronic kidney disease stage IIIb. He is currently taking statin.  He did not tolerate Metformin due to the GI side effects and is unwilling to continue with Metformin. He has been off of Metformin for 3 months however he is glycemic control is currently adequate with the hemoglobin A1c of 6.6 with the blood sugar readings at the time of his admission were within the target range.   Started Jardiance 10 mg daily at his last visit however he has had multiple bathroom trips since he has started Jardiance and is not sure if you would like to continue with this medication.  Peripheral neuropathy-currently well controlled on pregabalin 100 mg three times per day.     No new issues today.    Allergies   Allergen Reactions     Metformin      Diarrhea, stomach ache     Testosterone      Injections caused weight gain and patient self stopped       Current Outpatient Medications   Medication Sig Dispense Refill     amLODIPine (NORVASC) 10 MG tablet Take 1 tablet by mouth daily       aspirin 81 MG tablet Take 81 mg by mouth daily       cholecalciferol 25 MCG (1000 UT) TABS Take 1 tablet by mouth       Continuous Blood Gluc  (FREESTYLE WILLIE 14 DAY READER) HIREN Use to read blood sugars as per 's instructions. 1 each 0     Continuous  Blood Gluc Sensor (FREESTYLE WILLIE 14 DAY SENSOR) MISC Change every 14 days. 2 each 11     fluticasone (FLONASE) 50 MCG/ACT nasal spray Spray 2 sprays in nostril daily       KLOR-CON 20 MEQ packet Take 1 tablet by mouth daily       lisinopril (ZESTRIL) 10 MG tablet Take 1 tablet (10 mg) by mouth daily 90 tablet 0     metoprolol (LOPRESSOR) 100 MG tablet Take 1 tablet by mouth daily       multivitamin w/minerals (MULTIVITAMIN, THERAPEUTIC WITH MINERALS) tablet Take 1 tablet by mouth daily       omeprazole (PRILOSEC) 20 MG capsule Take 1 capsule by mouth daily       pregabalin (LYRICA) 100 MG capsule Take 100 mg by mouth 3 times daily       simvastatin (ZOCOR) 20 MG tablet Take 1 tablet by mouth daily       tadalafil (CIALIS) 20 MG tablet Take one tablet every other day with sex 30 tablet 3     predniSONE (DELTASONE) 20 MG tablet Take 20 mg by mouth as needed For gout flareup (Patient not taking: Reported on 11/9/2021)         Review of Systems     as per HPI above      Objective:   BP (!) 146/90 (BP Location: Left arm, Patient Position: Chair, Cuff Size: Adult Large)   Pulse 71   Wt 87.3 kg (192 lb 8 oz)   SpO2 98%   BMI 26.11 kg/m       Constitutional: Pleasant no acute cardiopulmonary distress.   EYES: anicteric, normal extra-ocular movements, no lid lag or retraction, is equal and reactive to light bilaterally.  Cardiovascular: RRR, S1, S2 normal.   Pulmonary/Chest: CTAB. No wheezing or rales.   Abdominal: +BS. Non tender to palpation.    Neurological: Alert and oriented.  No tremor and reflexes are symmetrical bilaterally and within the normal limits. Muscle strength 5/5.   Extremities: No edema.  Psychological: appropriate mood and affect.  Sensory exam of the foot is abnormal, tested with the monofilament. no lesions or ulcers. calluses involving heels.     In House Labs:         Hemoglobin A1C   Date Value Ref Range Status   11/09/2021 6.6 (A) 0.0 - 5.7 % Final       This note has been dictated using voice  recognition software.  As a result, there may be errors in the documentation that have gone undetected.  Please consider this when interpreting information in this documentation.

## 2021-11-16 NOTE — TELEPHONE ENCOUNTER
Left voicemail for patient to contact our office.       Nedra Jackson RN  Endocrine Care Coordinator  [facility]

## 2021-11-19 NOTE — TELEPHONE ENCOUNTER
PRIOR AUTHORIZATION DENIED    Medication: Freestyle PA Denied    Denial Date: 11/16/2021    Denial Rational:     Appeal Information:

## 2021-11-23 NOTE — TELEPHONE ENCOUNTER
Nieves Young MD  [facility] Endocrinology Adult [place] Yesterday (7:59 AM)     please inform pt.       Attempted to call the patient @ 11:18 AM, no answer and no voicemail. Will attempt to call at a later time.    Chely Villarreal [facility]  Adult Endocrinology  [facility]

## 2021-11-26 NOTE — TELEPHONE ENCOUNTER
Patient informed and states he will continue to poke his finger for his blood sugar readings. He did not have any other questions at this time.    Chely Villarreal [facility]  Adult Endocrinology  [facility]

## 2023-04-01 ENCOUNTER — HEALTH MAINTENANCE LETTER (OUTPATIENT)
Age: 68
End: 2023-04-01

## 2023-08-27 ENCOUNTER — HEALTH MAINTENANCE LETTER (OUTPATIENT)
Age: 68
End: 2023-08-27

## 2024-01-14 ENCOUNTER — HEALTH MAINTENANCE LETTER (OUTPATIENT)
Age: 69
End: 2024-01-14

## 2024-06-02 ENCOUNTER — HEALTH MAINTENANCE LETTER (OUTPATIENT)
Age: 69
End: 2024-06-02

## 2024-10-20 ENCOUNTER — HEALTH MAINTENANCE LETTER (OUTPATIENT)
Age: 69
End: 2024-10-20